# Patient Record
Sex: FEMALE | Race: WHITE | NOT HISPANIC OR LATINO
[De-identification: names, ages, dates, MRNs, and addresses within clinical notes are randomized per-mention and may not be internally consistent; named-entity substitution may affect disease eponyms.]

---

## 2020-01-01 ENCOUNTER — TRANSCRIPTION ENCOUNTER (OUTPATIENT)
Age: 65
End: 2020-01-01

## 2021-04-22 ENCOUNTER — TRANSCRIPTION ENCOUNTER (OUTPATIENT)
Age: 66
End: 2021-04-22

## 2021-04-24 ENCOUNTER — TRANSCRIPTION ENCOUNTER (OUTPATIENT)
Age: 66
End: 2021-04-24

## 2021-05-02 ENCOUNTER — TRANSCRIPTION ENCOUNTER (OUTPATIENT)
Age: 66
End: 2021-05-02

## 2021-09-29 ENCOUNTER — RESULT REVIEW (OUTPATIENT)
Age: 66
End: 2021-09-29

## 2022-06-21 ENCOUNTER — APPOINTMENT (OUTPATIENT)
Dept: RADIOLOGY | Facility: CLINIC | Age: 67
End: 2022-06-21
Payer: COMMERCIAL

## 2022-06-21 PROCEDURE — 74240 X-RAY XM UPR GI TRC 1CNTRST: CPT

## 2022-06-27 ENCOUNTER — TRANSCRIPTION ENCOUNTER (OUTPATIENT)
Age: 67
End: 2022-06-27

## 2022-09-29 ENCOUNTER — RESULT REVIEW (OUTPATIENT)
Age: 67
End: 2022-09-29

## 2024-03-15 ENCOUNTER — NON-APPOINTMENT (OUTPATIENT)
Age: 69
End: 2024-03-15

## 2024-04-30 PROBLEM — Z60.2 LIVES ALONE: Status: ACTIVE | Noted: 2024-04-30

## 2024-04-30 PROBLEM — H91.90 HEARING LOSS: Status: ACTIVE | Noted: 2024-04-30

## 2024-04-30 PROBLEM — Z78.9 SOCIAL ALCOHOL USE: Status: ACTIVE | Noted: 2024-04-30

## 2024-04-30 PROBLEM — D49.7 PINEAL TUMOR: Status: ACTIVE | Noted: 2024-04-30

## 2024-04-30 PROBLEM — E78.5 HYPERLIPIDEMIA: Status: RESOLVED | Noted: 2024-04-30 | Resolved: 2024-04-30

## 2024-04-30 RX ORDER — ATORVASTATIN CALCIUM 20 MG/1
20 TABLET, FILM COATED ORAL
Refills: 0 | Status: ACTIVE | COMMUNITY

## 2024-05-02 ENCOUNTER — APPOINTMENT (OUTPATIENT)
Dept: NEUROSURGERY | Facility: CLINIC | Age: 69
End: 2024-05-02
Payer: COMMERCIAL

## 2024-05-02 VITALS
SYSTOLIC BLOOD PRESSURE: 107 MMHG | HEART RATE: 84 BPM | WEIGHT: 110 LBS | RESPIRATION RATE: 18 BRPM | TEMPERATURE: 97.1 F | HEIGHT: 64 IN | OXYGEN SATURATION: 98 % | BODY MASS INDEX: 18.78 KG/M2 | DIASTOLIC BLOOD PRESSURE: 64 MMHG

## 2024-05-02 DIAGNOSIS — Z78.9 OTHER SPECIFIED HEALTH STATUS: ICD-10-CM

## 2024-05-02 DIAGNOSIS — Z85.828 PERSONAL HISTORY OF OTHER MALIGNANT NEOPLASM OF SKIN: ICD-10-CM

## 2024-05-02 DIAGNOSIS — Z60.2 PROBLEMS RELATED TO LIVING ALONE: ICD-10-CM

## 2024-05-02 DIAGNOSIS — E78.5 HYPERLIPIDEMIA, UNSPECIFIED: ICD-10-CM

## 2024-05-02 DIAGNOSIS — H91.90 UNSPECIFIED HEARING LOSS, UNSPECIFIED EAR: ICD-10-CM

## 2024-05-02 DIAGNOSIS — Z86.03 PERSONAL HISTORY OF NEOPLASM OF UNCERTAIN BEHAVIOR: ICD-10-CM

## 2024-05-02 DIAGNOSIS — D49.7 NEOPLASM OF UNSPECIFIED BEHAVIOR OF ENDOCRINE GLANDS AND OTHER PARTS OF NERVOUS SYSTEM: ICD-10-CM

## 2024-05-02 PROCEDURE — 99202 OFFICE O/P NEW SF 15 MIN: CPT

## 2024-05-02 SDOH — SOCIAL STABILITY - SOCIAL INSECURITY: PROBLEMS RELATED TO LIVING ALONE: Z60.2

## 2024-05-02 NOTE — HISTORY OF PRESENT ILLNESS
[de-identified] : The patient is a 68 year old female who began having hearing loss L>R. She noticed approx 5 months ago using the phone that she couldn't hear as well on the left. 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 She also had formal hearing evaluation.  ENT: Dr. Vickers in [place] PCP: referring doctor Dr. Zac Hartmann  Today 5/2/24: no complaints aside from hearing loss. denies HA, visual changes, imbalances.

## 2024-05-02 NOTE — ASSESSMENT
[FreeTextEntry1] : I, Dr. Damon, personally performed the evaluation and management (E/M) services for this new patient who presents today with a new diagnosis of pineal cyst and hearing loss L>R. That E/M includes conducting the examination, assessing all new/exacerbated conditions, and establishing a new plan of care. Today, my ACP, Gini Rust, was here to observe my evaluation and management services for this new problem/exacerbated condition to be followed going forward.  PLAN: - Repeat MRI in one year

## 2024-05-02 NOTE — ADDENDUM
[FreeTextEntry1] :   Patient Name: KVNG PICKARD   Chief Complaint (CC):   - Hearing loss; Patient noticed a perceived fuzziness when placing phone to right ear, prompting a visit to an ENT and subsequent audiological testing.   History of Present Illness:   - The patient, a 68-year-old female, presents with hearing loss in the right ear. She noticed the hearing loss about six months ago when she perceived fuzziness when placing her phone to her right ear. She thought it was an issue with her phone initially but ultimately decided to get checked out after hearing others discuss age-related hearing loss. The patient underwent an audiological test that confirmed the issue and she now uses a hearing aid.   Past Medical History (PMH):   - The patient had a lumpectomy for stage one breast cancer about twenty years ago. Since then, she has undergone two surgeries for skin cancer. One instance was a squamous cell carcinoma surgery in 2019; the other was basal cell near her collarbone. The patient has also previously undergone a tonsillectomy.   Family History (FH):   - Not specified   Social History (SH):   - The patient lives alone. 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   Medications:   - The patient is currently taking atorvastatin for high cholesterol, Heliocare (a supplement recommended by her dermatologist for skin cancer prevention), and calcium citrate for bone density maintenance.   Allergies:   - Not specified   Review of Systems (ROS):   - Not specified   Physical Examination (PE):   - Not specified   Laboratory/Data Review:   - Not specified   Assessment:   - Patient had an MRI for hearing loss. It was determined that the patient has a benign cyst, likely present her entire life. This is not the cause of her hearing loss. She had a minor head injury after the MRI, but there is no concern there, as it was low impact and the patient showed no worrying symptoms.   Plan:   - Repeat the MRI in one year's time. No treatment needed for the benign cyst at this time. It's unlikely the patient will ever need treatment for the cyst.   Preventive Health:   - Not specified   Patient Name: KVNG PICKARD   Chief Complaint (CC):   - Not specified   History of Present Illness:   - The patient has a quarter-inch cyst in the brain, at the back of the pineal gland. The cyst is non-pathological and is causing no issues with spinal fluid drainage. The patient also has enlarged fluid spaces around the gland which are just anatomical features. The patient expresses no concerns about the condition.   Past Medical History (PMH):   - Not provided   Family History (FH):   - Not provided   Social History (SH):   - Not provided   Medications:   - Not provided   Allergies:   - Not provided   Review of Systems (ROS):   - Not provided   Physical Examination (PE):   - Not provided   Laboratory/Data Review:   - Brain imaging reveals a cyst of about quarter-inch located at the back of the pineal gland. There's also an observation of enlarged fluid spaces generally in that region, however, they are of no pathological significance.   Assessment:   - The found cyst is non-pathological and currently poses no threat to the patient's health or brain function. The enlarged fluid space is deemed normal and not a cause for concern.   Plan:   - As the cyst is non-pathological and not causing any issues, no immediate action is needed. However, it would be beneficial to monitor this cyst to ensure that there is no growth that could potentially block spinal fluid drainage pathways. Patient education about the lack of knowledge concerning the concrete function of the pineal gland was also provided.   Preventive Health:   - Monitor the cyst to ensure there's no growth that could potentially block the patient's spinal fluid drainage pathways.   [No Edema] : there was no peripheral edema [No Spinal Tenderness] : no spinal tenderness [No CVA Tenderness] : no CVA  tenderness [Normal] : normal gait, coordination grossly intact, no focal deficits and deep tendon reflexes were 2+ and symmetric [de-identified] : +B/L lumbar paraspinal tenderness, negative SLR

## 2024-05-02 NOTE — PHYSICAL EXAM
[Oriented To Time, Place, And Person] : oriented to person, place, and time [Cranial Nerves Optic (II)] : visual acuity intact bilaterally,  pupils equal round and reactive to light [Cranial Nerves Oculomotor (III)] : extraocular motion intact [Cranial Nerves Trigeminal (V)] : facial sensation intact symmetrically [Cranial Nerves Facial (VII)] : face symmetrical [Cranial Nerves Glossopharyngeal (IX)] : tongue and palate midline [Cranial Nerves Accessory (XI - Cranial And Spinal)] : head turning and shoulder shrug symmetric [Cranial Nerves Hypoglossal (XII)] : there was no tongue deviation with protrusion [Motor Tone] : muscle tone was normal in all four extremities [Motor Strength] : muscle strength was normal in all four extremities [Abnormal Walk] : normal gait [Balance] : balance was intact [PERRL With Normal Accommodation] : pupils were equal in size, round, reactive to light, with normal accommodation [Extraocular Movements] : extraocular movements were intact [Full Visual Field] : full visual field [Limited Balance] : balance was intact [Tremor] : no tremor present [FreeTextEntry5] : L>R decreased general hearing now with hearing aid

## 2024-08-31 ENCOUNTER — NON-APPOINTMENT (OUTPATIENT)
Age: 69
End: 2024-08-31

## 2025-02-01 ENCOUNTER — NON-APPOINTMENT (OUTPATIENT)
Age: 70
End: 2025-02-01

## 2025-02-11 ENCOUNTER — NON-APPOINTMENT (OUTPATIENT)
Age: 70
End: 2025-02-11

## 2025-05-15 ENCOUNTER — APPOINTMENT (OUTPATIENT)
Dept: NEUROSURGERY | Facility: CLINIC | Age: 70
End: 2025-05-15